# Patient Record
Sex: MALE | Race: WHITE | NOT HISPANIC OR LATINO | Employment: OTHER | URBAN - NONMETROPOLITAN AREA
[De-identification: names, ages, dates, MRNs, and addresses within clinical notes are randomized per-mention and may not be internally consistent; named-entity substitution may affect disease eponyms.]

---

## 2022-08-24 NOTE — PROGRESS NOTES
Jonathan Monreal presents to [facility]      Chief Complaint  Hypertension, Hyperlipidemia    HPI     Respiratory:  SOA:  no. Exertional dyspnea: no.  Dyspnea at rest: no.  Flights of stairs climbable: 5  Cardiology:  Chest pain: no.  Dizziness: no. Easily fatigued: no.  Pedal edema: no.  Light-headiness: no.  Symptoms of claudication: no.  Orthopnea: no.  Palpitations: no. Tachycardia: no.  Ophthalmology:  Last eye exam date: 2019 . Vision changes: none.  Gastroenterology:  Heartburn: no.  Nausea: no.      Current Outpatient Medications:   •  ASPIRIN 81 PO, Take 1 tablet by mouth Daily., Disp: , Rfl:   •  atorvastatin (LIPITOR) 20 MG tablet, Take 20 mg by mouth Every Night., Disp: , Rfl:   •  carvedilol (COREG) 12.5 MG tablet, Take 12.5 mg by mouth 2 (Two) Times a Day., Disp: , Rfl:   •  donepezil (ARICEPT) 10 MG tablet, One-half tab po qd., Disp: , Rfl:   •  fenofibrate (TRICOR) 54 MG tablet, Take 54 mg by mouth Daily With Dinner., Disp: , Rfl:   •  finasteride (PROSCAR) 5 MG tablet, Take 5 mg by mouth Daily., Disp: , Rfl:   •  levothyroxine (SYNTHROID, LEVOTHROID) 125 MCG tablet, Take 125 mcg by mouth Daily., Disp: , Rfl:   •  memantine (NAMENDA) 10 MG tablet, Take 1 tablet by mouth 2 (Two) Times a Day., Disp: , Rfl:   •  metFORMIN (GLUCOPHAGE) 1000 MG tablet, Take 1,000 mg by mouth 2 (Two) Times a Day With Meals., Disp: , Rfl:   •  multivitamin (THERAGRAN) tablet tablet, Take 1 tablet by mouth Daily., Disp: , Rfl:   •  quinapril (ACCUPRIL) 40 MG tablet, Take 20 mg by mouth 2 (Two) Times a Day., Disp: , Rfl:   •  Trulicity 0.75 MG/0.5ML solution pen-injector, Inject 0.5 mL under the skin into the appropriate area as directed Every 7 (Seven) Days., Disp: , Rfl:   •  Vitamin B12 (CYANOCOBALAMIN) 500 MCG tablet tablet, Take 1 tablet by mouth Daily., Disp: , Rfl:      Allergies   Allergen Reactions   • Penicillins Hives        Past Medical History:   Diagnosis Date   • Alzheimer's  "dementia ([facility]) 2022   • BPH (benign prostatic hyperplasia)    • Diabetes ([facility])    • Hyperlipidemia    • Hypertension    • Hypothyroid    • Wears glasses        Past Surgical History:   Procedure Laterality Date   • CIRCUMCISION  03/1943   • HAND SURGERY Right 1977    Injury        Family History   Problem Relation Age of Onset   • Coronary artery disease Mother    • Alzheimer's disease Father    • Lung cancer Father    • Myasthenia gravis Sister    • Other Son         TBI @ BIRTH; HANDICAPPED MENTALLY        Social History     Socioeconomic History   • Marital status:    Tobacco Use   • Smoking status: Never Smoker   • Smokeless tobacco: Never Used   Substance and Sexual Activity   • Alcohol use: Never   • Drug use: Never   • Sexual activity: Defer        ROS: As in HPI      Objective   Vital Signs:  /58 (BP Location: Left arm, Patient Position: Sitting, Cuff Size: Adult)   Pulse 69   Temp 97.7 °F (36.5 °C) (Infrared)   Resp 16   Ht 175.3 cm (69\")   Wt 89.5 kg (197 lb 6.4 oz)   SpO2 98%   BMI 29.15 kg/m²   Estimated body mass index is 29.15 kg/m² as calculated from the following:    Height as of this encounter: 175.3 cm (69\").    Weight as of this encounter: 89.5 kg (197 lb 6.4 oz).        Physical Exam  Constitutional:       Comments: No acute distress. Well kept. Pleasant, cheerful, relaxed, interactive, coherant   Neck:      Vascular: No JVD.   Cardiovascular:      Rate and Rhythm: Normal rate and regular rhythm.      Pulses:           Carotid pulses are 2+ on the right side and 2+ on the left side.       Radial pulses are 2+ on the right side and 2+ on the left side.        Femoral pulses are 2+ on the right side and 1+ on the left side.       Popliteal pulses are 2+ on the right side and 2+ on the left side.        Dorsalis pedis pulses are detected w/ Doppler on the right side and detected w/ Doppler on the left side.        Posterior tibial pulses are 2+ on the right side and 2+ on the " left side.      Heart sounds: Normal heart sounds.      Comments: No lower extremity varicosities  Pulmonary:      Effort: Pulmonary effort is normal.      Breath sounds: Normal breath sounds.   Musculoskeletal:      Right lower leg: No edema.      Left lower leg: No edema.   Skin:     Comments: No dystrophic toenails. No integument changes of the shins or feet.  Toes are warm and pink         Result Review :                    Assessment and Plan   Diagnoses and all orders for this visit:    1. Benign hypertension (Primary)  Assessment & Plan:  MDM:  Hypertension is stable.  Discussed mild increase in systolic BP this morning.  Discussed proper nutrition, low sodium, low saturated fats.  No change in medication, at this time.  Review of prior vital signs done and discussed.  Labs are done by his [place] physician and he is to follow-up there in 3 months      2. Mixed hyperlipidemia    3. Type 2 diabetes mellitus without complication, without long-term current use of insulin (HCC)  Assessment & Plan:  MDM:  Diabetes is stable. Avoid sweets, pastries, bread products, pasta, potatoes, sweetened beverages.   Consume fruit, vegetables, whole grains, low fat dairy, baked fish, poultry, lean meats        4. Acquired hypothyroidism    30 min spent with patient, discussion of cardiac function, vascular assessment of the feet, review of past charting, when to follow up with his [place] physician and also obtain ophthalmic appointment         Follow Up   Return in about 6 months (around 2/25/2023) for Cor.    Patient was given instructions and counseling regarding his condition or for health maintenance advice. Please see specific information pulled into the AVS if appropriate.   There are no Patient Instructions on file for this visit.

## 2022-08-25 ENCOUNTER — OFFICE VISIT (OUTPATIENT)
Dept: FAMILY MEDICINE CLINIC | Age: 77
End: 2022-08-25

## 2022-08-25 VITALS
OXYGEN SATURATION: 98 % | WEIGHT: 197.4 LBS | HEIGHT: 69 IN | DIASTOLIC BLOOD PRESSURE: 58 MMHG | TEMPERATURE: 97.7 F | HEART RATE: 69 BPM | SYSTOLIC BLOOD PRESSURE: 142 MMHG | BODY MASS INDEX: 29.24 KG/M2 | RESPIRATION RATE: 16 BRPM

## 2022-08-25 DIAGNOSIS — E03.9 ACQUIRED HYPOTHYROIDISM: ICD-10-CM

## 2022-08-25 DIAGNOSIS — E78.2 MIXED HYPERLIPIDEMIA: ICD-10-CM

## 2022-08-25 DIAGNOSIS — I10 BENIGN HYPERTENSION: Primary | ICD-10-CM

## 2022-08-25 DIAGNOSIS — E11.9 TYPE 2 DIABETES MELLITUS WITHOUT COMPLICATION, WITHOUT LONG-TERM CURRENT USE OF INSULIN: ICD-10-CM

## 2022-08-25 PROCEDURE — 99214 OFFICE O/P EST MOD 30 MIN: CPT | Performed by: FAMILY MEDICINE

## 2022-08-25 RX ORDER — DULAGLUTIDE 0.75 MG/.5ML
0.5 INJECTION, SOLUTION SUBCUTANEOUS
COMMUNITY
Start: 2022-08-04

## 2022-08-25 RX ORDER — FENOFIBRATE 54 MG/1
54 TABLET ORAL
COMMUNITY
Start: 2022-05-26

## 2022-08-25 RX ORDER — DONEPEZIL HYDROCHLORIDE 10 MG/1
TABLET, FILM COATED ORAL
COMMUNITY
Start: 2022-08-03

## 2022-08-25 RX ORDER — LANOLIN ALCOHOL/MO/W.PET/CERES
1 CREAM (GRAM) TOPICAL DAILY
COMMUNITY

## 2022-08-25 RX ORDER — ATORVASTATIN CALCIUM 20 MG/1
20 TABLET, FILM COATED ORAL NIGHTLY
COMMUNITY
Start: 2022-07-09

## 2022-08-25 RX ORDER — LEVOTHYROXINE SODIUM 0.12 MG/1
125 TABLET ORAL DAILY
COMMUNITY

## 2022-08-25 RX ORDER — QUINAPRIL 40 MG/1
20 TABLET ORAL 2 TIMES DAILY
COMMUNITY
Start: 2022-05-27

## 2022-08-25 RX ORDER — CARVEDILOL 12.5 MG/1
12.5 TABLET ORAL 2 TIMES DAILY
COMMUNITY
Start: 2022-08-03

## 2022-08-25 RX ORDER — DIPHENOXYLATE HYDROCHLORIDE AND ATROPINE SULFATE 2.5; .025 MG/1; MG/1
1 TABLET ORAL DAILY
COMMUNITY

## 2022-08-25 RX ORDER — FINASTERIDE 5 MG/1
5 TABLET, FILM COATED ORAL DAILY
COMMUNITY
Start: 2022-05-26

## 2022-08-25 RX ORDER — MEMANTINE HYDROCHLORIDE 10 MG/1
1 TABLET ORAL 2 TIMES DAILY
COMMUNITY
Start: 2022-08-18

## 2022-08-25 NOTE — ASSESSMENT & PLAN NOTE
MDM:  Hypertension is stable.  Discussed mild increase in systolic BP this morning.  Discussed proper nutrition, low sodium, low saturated fats.  No change in medication, at this time.  Review of prior vital signs done and discussed.  Labs are done by his [place] physician and he is to follow-up there in 3 months

## 2022-08-25 NOTE — ASSESSMENT & PLAN NOTE
MDM:  Diabetes is stable. Avoid sweets, pastries, bread products, pasta, potatoes, sweetened beverages.   Consume fruit, vegetables, whole grains, low fat dairy, baked fish, poultry, lean meats

## 2022-08-31 ENCOUNTER — PATIENT ROUNDING (BHMG ONLY) (OUTPATIENT)
Dept: FAMILY MEDICINE CLINIC | Age: 77
End: 2022-08-31

## 2022-08-31 NOTE — PROGRESS NOTES
August 31, 2022    Hello, may I speak with Jonathan Monreal?    My name is Betty     I am  with [facility]  [address] [place] [address] [place] [address].    Before we get started may I verify your date of birth? 1945    I am calling to officially welcome you to our practice and ask about your recent visit. Is this a good time to talk? yes    Tell me about your visit with us. What things went well? the whole visit went well. Dr. Tomlinson is a great doctor.       We're always looking for ways to make our patients' experiences even better. Do you have recommendations on ways we may improve?  no    Overall were you satisfied with your first visit to our practice? yes       I appreciate you taking the time to speak with me today. Is there anything else I can do for you? no      Thank you, and have a great day.

## 2023-04-26 ENCOUNTER — TELEPHONE (OUTPATIENT)
Dept: FAMILY MEDICINE CLINIC | Age: 78
End: 2023-04-26
Payer: MEDICARE

## 2023-04-26 DIAGNOSIS — I10 BENIGN HYPERTENSION: Primary | ICD-10-CM

## 2023-04-26 RX ORDER — DONEPEZIL HYDROCHLORIDE 10 MG/1
10 TABLET, FILM COATED ORAL NIGHTLY
Qty: 30 TABLET | Refills: 2 | Status: SHIPPED | OUTPATIENT
Start: 2023-04-26

## 2023-04-26 RX ORDER — QUINAPRIL 40 MG/1
20 TABLET ORAL 2 TIMES DAILY
Qty: 180 TABLET | Refills: 0 | Status: SHIPPED | OUTPATIENT
Start: 2023-04-26 | End: 2023-04-26 | Stop reason: ALTCHOICE

## 2023-04-26 RX ORDER — QUINAPRIL 20 MG/1
20 TABLET ORAL 2 TIMES DAILY
Qty: 360 TABLET | Refills: 0 | Status: SHIPPED | OUTPATIENT
Start: 2023-04-26

## 2023-04-26 RX ORDER — ATORVASTATIN CALCIUM 20 MG/1
20 TABLET, FILM COATED ORAL NIGHTLY
Qty: 90 TABLET | Refills: 0 | Status: SHIPPED | OUTPATIENT
Start: 2023-04-26

## 2023-04-26 RX ORDER — QUINAPRIL 40 MG/1
20 TABLET ORAL 2 TIMES DAILY
Qty: 180 TABLET | Refills: 0 | Status: CANCELLED | OUTPATIENT
Start: 2023-04-26

## 2023-04-26 RX ORDER — MEMANTINE HYDROCHLORIDE 10 MG/1
10 TABLET ORAL 2 TIMES DAILY
Qty: 180 TABLET | Refills: 0 | Status: SHIPPED | OUTPATIENT
Start: 2023-04-26

## 2023-04-26 NOTE — TELEPHONE ENCOUNTER
Pt came into the office and reported these medications need a refill sent to [facility] in [facility].      Rx Refill Note  Requested Prescriptions     Pending Prescriptions Disp Refills   • donepezil (ARICEPT) 10 MG tablet 30 tablet 2     Sig: Take 1 tablet by mouth Every Night. One-half tab po qd.   • memantine (NAMENDA) 10 MG tablet 180 tablet 0     Sig: Take 1 tablet by mouth 2 (Two) Times a Day.   • atorvastatin (LIPITOR) 20 MG tablet 90 tablet 0     Sig: Take 1 tablet by mouth Every Night.   • quinapril (ACCUPRIL) 40 MG tablet 180 tablet 0     Sig: Take 0.5 tablets by mouth 2 (Two) Times a Day.      Last office visit with prescribing clinician: 8/25/2022   Last telemedicine visit with prescribing clinician: 5/3/2023   Next office visit with prescribing clinician: 5/3/2023                         Would you like a call back once the refill request has been completed: [] Yes [] No    If the office needs to give you a call back, can they leave a voicemail: [] Yes [] No    Inez Churchill MA  04/26/23, 10:34 EDT

## 2023-04-26 NOTE — TELEPHONE ENCOUNTER
Called and spoke to patient. He was not home to check remaining pill quantity. Pt stated that you can try to send this Rx to [facility] in [place].

## 2023-04-26 NOTE — TELEPHONE ENCOUNTER
Called pharmacy and verified script instructions for donepezil 10 mg.    Pharmacy also stated that every dosage of Quinapril (ACCUPRIL) is on back order. Pt is not able to start this right now due to back order.

## 2023-04-26 NOTE — TELEPHONE ENCOUNTER
Hub staff attempted to follow warm transfer process and was unsuccessful     Caller: [facility] - [place], [place] - [address] - [phone]  - [phone] FX    Relationship to patient: Pharmacy    Best call back number: [phone]    PHARMACY NEEDS CLARIFICATION ON donepezil (ARICEPT) 10 MG tablet.

## 2023-05-09 ENCOUNTER — OFFICE VISIT (OUTPATIENT)
Dept: FAMILY MEDICINE CLINIC | Age: 78
End: 2023-05-09
Payer: MEDICARE

## 2023-05-09 VITALS
OXYGEN SATURATION: 98 % | SYSTOLIC BLOOD PRESSURE: 165 MMHG | TEMPERATURE: 97.7 F | HEART RATE: 71 BPM | HEIGHT: 69 IN | WEIGHT: 192.4 LBS | BODY MASS INDEX: 28.5 KG/M2 | RESPIRATION RATE: 16 BRPM | DIASTOLIC BLOOD PRESSURE: 74 MMHG

## 2023-05-09 DIAGNOSIS — I10 BENIGN HYPERTENSION: Primary | ICD-10-CM

## 2023-05-09 DIAGNOSIS — E03.9 ACQUIRED HYPOTHYROIDISM: ICD-10-CM

## 2023-05-09 DIAGNOSIS — E78.2 MIXED HYPERLIPIDEMIA: ICD-10-CM

## 2023-05-09 DIAGNOSIS — E11.9 TYPE 2 DIABETES MELLITUS WITHOUT COMPLICATION, WITHOUT LONG-TERM CURRENT USE OF INSULIN: ICD-10-CM

## 2023-05-09 RX ORDER — LEVOTHYROXINE SODIUM 112 UG/1
1 TABLET ORAL DAILY
COMMUNITY
Start: 2023-03-06

## 2023-05-09 RX ORDER — ENALAPRIL MALEATE 20 MG/1
1 TABLET ORAL DAILY
COMMUNITY
Start: 2023-04-06

## 2023-05-09 NOTE — ASSESSMENT & PLAN NOTE
MDM: Patient encouraged to obtain ophthalmic appointment.  Avoid sweets, pastries, bread products, pasta, potatoes, sweetened beverages.   Consume fruit, vegetables, whole grains, low fat dairy, baked fish, poultry, lean meats

## 2023-05-09 NOTE — ASSESSMENT & PLAN NOTE
MDM: Review of chart shows persistent BP elevation.  Increase ACEI.  Gerardo in 2 week, sooner if not feeling well

## 2023-05-09 NOTE — PROGRESS NOTES
Jonathan Monreal presents to [facility]      Chief Complaint  HTN    HPI Does not know what his BP readings have been by his [place] PCP.    Respiratory:  SOA:  no. Exertional dyspnea: no.  Dyspnea at rest: no.  Flights of stairs climbable: 5  Cardiology:  Chest pain: no.  Dizziness: no. Easily fatigued: no.  Pedal edema: no.  Light-headiness: no.  Symptoms of claudication: no.  Orthopnea: no.  Palpitations: no. Tachycardia: no.  Ophthalmology:  Last eye exam date: 2019 . Vision changes: none.  Gastroenterology:  Heartburn: no.  Nausea: no.    ROS: Synthroid dose decreased this past January.  Gerardo on level was not done.  Taking Synthroid at night with all of his other medications      Current Outpatient Medications:   •  ASPIRIN 81 PO, Take 1 tablet by mouth Daily., Disp: , Rfl:   •  atorvastatin (LIPITOR) 20 MG tablet, Take 1 tablet by mouth Every Night., Disp: 90 tablet, Rfl: 0  •  carvedilol (COREG) 12.5 MG tablet, Take 1 tablet by mouth 2 (Two) Times a Day., Disp: , Rfl:   •  fenofibrate (TRICOR) 54 MG tablet, Take 1 tablet by mouth Daily With Dinner., Disp: , Rfl:   •  finasteride (PROSCAR) 5 MG tablet, Take 1 tablet by mouth Daily., Disp: , Rfl:   •  metFORMIN (GLUCOPHAGE) 1000 MG tablet, Take 1 tablet by mouth 2 (Two) Times a Day With Meals., Disp: , Rfl:   •  multivitamin (THERAGRAN) tablet tablet, Take 1 tablet by mouth Daily., Disp: , Rfl:   •  Trulicity 0.75 MG/0.5ML solution pen-injector, Inject 0.75 mg under the skin into the appropriate area as directed Every 7 (Seven) Days., Disp: , Rfl:   •  donepezil (ARICEPT) 10 MG tablet, Take 1 tablet by mouth Every Night. One-half tab po qd., Disp: 30 tablet, Rfl: 2  •  enalapril (VASOTEC) 20 MG tablet, Take 1 tablet by mouth Daily., Disp: , Rfl:   •  levothyroxine (SYNTHROID, LEVOTHROID) 112 MCG tablet, Take 1 tablet by mouth Daily., Disp: , Rfl:   •  memantine (NAMENDA) 10 MG tablet, Take 1 tablet by mouth 2 (Two) Times a Day.,  "Disp: 180 tablet, Rfl: 0     Allergies   Allergen Reactions   • Penicillins Hives        Past Medical History:   Diagnosis Date   • Alzheimer's dementia 2022   • BPH (benign prostatic hyperplasia)    • Diabetes    • Wears glasses        Past Surgical History:   Procedure Laterality Date   • CIRCUMCISION  03/1943   • HAND SURGERY Right 1977    Injury        Family History   Problem Relation Age of Onset   • Coronary artery disease Mother    • Alzheimer's disease Father    • Lung cancer Father    • Myasthenia gravis Sister    • Other Son         TBI @ BIRTH; HANDICAPPED MENTALLY        Social History     Socioeconomic History   • Marital status:    Tobacco Use   • Smoking status: Never     Passive exposure: Past   • Smokeless tobacco: Never   Vaping Use   • Vaping Use: Never used   Substance and Sexual Activity   • Alcohol use: Never   • Drug use: Never   • Sexual activity: Defer            Objective   Vital Signs:  /74 (BP Location: Right arm, Patient Position: Sitting, Cuff Size: Adult)   Pulse 71   Temp 97.7 °F (36.5 °C) (Temporal)   Resp 16   Ht 175.3 cm (69\")   Wt 87.3 kg (192 lb 6.4 oz)   SpO2 98%   BMI 28.41 kg/m²   Estimated body mass index is 28.41 kg/m² as calculated from the following:    Height as of this encounter: 175.3 cm (69\").    Weight as of this encounter: 87.3 kg (192 lb 6.4 oz).        Physical Exam  General:  No acute distress, cheerful, well kept, interactive, good energy level.  Cardiac:  Heart regular rate and rhythm without murmur.  Respiratory:  Lungs clear to auscultation bilaterally and excellent air movement throughout.  Pulses:                            Right          Left         Bruit  Carotid                2+              2+           No  Radial                 2+              2+  Abd Ao               Not bounding  Femoral             Not palpable bilaterally  DP                      1+              1+  PT                      2+              2+  Venous:  Absent " JVD. Absent lower extremity varicosities.  Dermatology:  Non-dystrophic toe nails. No Integument changes of the toes, feet, ankles, shins.  Lower Extremities:  No leg edema.  Toes are warm and pink    Result Review :                    Assessment and Plan   Diagnoses and all orders for this visit:    1. Benign hypertension (Primary)  Assessment & Plan:  MDM: Review of chart shows persistent BP elevation.  Increase ACEI.  Gerardo in 2 week, sooner if not feeling well      2. Acquired hypothyroidism  Assessment & Plan:  MDM; Discussed changing Synthroid to AM on an empty stomach.    Orders:  -     TSH    3. Mixed hyperlipidemia  Assessment & Plan:  MDM: Labs done in [place], this past January.  Will obtain and review the results      4. Type 2 diabetes mellitus without complication, without long-term current use of insulin  Assessment & Plan:  MDM: Patient encouraged to obtain ophthalmic appointment.  Avoid sweets, pastries, bread products, pasta, potatoes, sweetened beverages.   Consume fruit, vegetables, whole grains, low fat dairy, baked fish, poultry, lean meats                 Follow Up   Return in about 2 weeks (around 5/23/2023) for F/U BP.    Patient was given instructions and counseling regarding his condition or for health maintenance advice. Please see specific information pulled into the AVS if appropriate.     Odessa Tomlinson MD

## 2023-05-10 ENCOUNTER — CLINICAL SUPPORT (OUTPATIENT)
Dept: FAMILY MEDICINE CLINIC | Age: 78
End: 2023-05-10
Payer: MEDICARE

## 2023-05-10 DIAGNOSIS — E03.9 ACQUIRED HYPOTHYROIDISM: ICD-10-CM

## 2023-05-10 PROCEDURE — 36415 COLL VENOUS BLD VENIPUNCTURE: CPT | Performed by: FAMILY MEDICINE

## 2023-05-10 PROCEDURE — 84443 ASSAY THYROID STIM HORMONE: CPT | Performed by: FAMILY MEDICINE

## 2023-05-10 NOTE — PROGRESS NOTES
Venipuncture Blood Specimen Collection  Venipuncture performed in (L) arm via butterfly by Aneta Mejia LPN with good hemostasis. Patient tolerated the procedure well without complications.   05/10/23   Aneta Mejia LPN

## 2023-05-11 LAB — TSH SERPL DL<=0.05 MIU/L-ACNC: 0.33 UIU/ML (ref 0.27–4.2)

## 2023-05-24 ENCOUNTER — OFFICE VISIT (OUTPATIENT)
Dept: FAMILY MEDICINE CLINIC | Age: 78
End: 2023-05-24
Payer: MEDICARE

## 2023-05-24 VITALS
WEIGHT: 193 LBS | OXYGEN SATURATION: 97 % | TEMPERATURE: 97.1 F | SYSTOLIC BLOOD PRESSURE: 142 MMHG | RESPIRATION RATE: 16 BRPM | DIASTOLIC BLOOD PRESSURE: 58 MMHG | BODY MASS INDEX: 28.58 KG/M2 | HEIGHT: 69 IN | HEART RATE: 68 BPM

## 2023-05-24 DIAGNOSIS — I10 BENIGN HYPERTENSION: Primary | ICD-10-CM

## 2023-05-24 DIAGNOSIS — Z79.899 ENCOUNTER FOR MEDICATION REVIEW: ICD-10-CM

## 2023-05-24 DIAGNOSIS — R19.5 LOOSE STOOLS: ICD-10-CM

## 2023-05-24 PROCEDURE — 3078F DIAST BP <80 MM HG: CPT | Performed by: FAMILY MEDICINE

## 2023-05-24 PROCEDURE — 1159F MED LIST DOCD IN RCRD: CPT | Performed by: FAMILY MEDICINE

## 2023-05-24 PROCEDURE — 3077F SYST BP >= 140 MM HG: CPT | Performed by: FAMILY MEDICINE

## 2023-05-24 PROCEDURE — 99213 OFFICE O/P EST LOW 20 MIN: CPT | Performed by: FAMILY MEDICINE

## 2023-05-24 PROCEDURE — 1160F RVW MEDS BY RX/DR IN RCRD: CPT | Performed by: FAMILY MEDICINE

## 2023-05-24 NOTE — PROGRESS NOTES
"Chief Complaint  Hypertension (Follow up )    History of Present Illness   No side effects from increased dose of Enalapril 20 mg tab to 2 tabs q AM.  Working outside in the sun.  No palpitations, light-headiness, chest discomfort, shortness of air, headache, blurred vision or pedal edema.     Review of Systems : Loose stools x 2-3/day since return from his [facility] home. No blood, melena, mucous, abdominal pain or cramping.  Appetite is good.  Not sure if his sugar intake or fruit intake has increased or other changes in his diet    Objective     Vital Signs:   /58 (BP Location: Left arm, Patient Position: Sitting)   Pulse 68   Temp 97.1 °F (36.2 °C) (Infrared)   Resp 16   Ht 175.3 cm (69\")   Wt 87.5 kg (193 lb)   SpO2 97%   BMI 28.50 kg/m²   Current Outpatient Medications on File Prior to Visit   Medication Sig Dispense Refill   • ASPIRIN 81 PO Take 1 tablet by mouth Daily.     • atorvastatin (LIPITOR) 20 MG tablet Take 1 tablet by mouth Every Night. 90 tablet 0   • carvedilol (COREG) 12.5 MG tablet Take 1 tablet by mouth 2 (Two) Times a Day.     • donepezil (ARICEPT) 10 MG tablet Take 1 tablet by mouth Every Night. One-half tab po qd. 30 tablet 2   • enalapril (VASOTEC) 20 MG tablet Take 2 tablets by mouth Daily.     • fenofibrate (TRICOR) 54 MG tablet Take 1 tablet by mouth Daily With Dinner.     • finasteride (PROSCAR) 5 MG tablet Take 1 tablet by mouth Daily.     • levothyroxine (SYNTHROID, LEVOTHROID) 112 MCG tablet Take 1 tablet by mouth Daily.     • memantine (NAMENDA) 10 MG tablet Take 1 tablet by mouth 2 (Two) Times a Day. 180 tablet 0   • metFORMIN (GLUCOPHAGE) 1000 MG tablet Take 1 tablet by mouth 2 (Two) Times a Day With Meals.     • multivitamin (THERAGRAN) tablet tablet Take 1 tablet by mouth Daily.     • Trulicity 0.75 MG/0.5ML solution pen-injector Inject 0.75 mg under the skin into the appropriate area as directed Every 7 (Seven) Days.       No current facility-administered " medications on file prior to visit.        Past Medical History:   Diagnosis Date   • Alzheimer's dementia 2022   • BPH (benign prostatic hyperplasia)    • Diabetes    • Wears glasses       Past Surgical History:   Procedure Laterality Date   • CIRCUMCISION  03/1943   • HAND SURGERY Right 1977    Injury      Family History   Problem Relation Age of Onset   • Coronary artery disease Mother    • Alzheimer's disease Father    • Lung cancer Father    • Myasthenia gravis Sister    • Other Son         TBI @ BIRTH; HANDICAPPED MENTALLY      Social History     Socioeconomic History   • Marital status:    Tobacco Use   • Smoking status: Never     Passive exposure: Past   • Smokeless tobacco: Never   Vaping Use   • Vaping Use: Never used   Substance and Sexual Activity   • Alcohol use: Never   • Drug use: Never   • Sexual activity: Defer         Office Visit on 05/09/2023   Component Date Value Ref Range Status   • TSH 05/10/2023 0.328  0.270 - 4.200 uIU/mL Final         Physical Exam   GENERAL:   No acute distress, pleasant, excellent energy level.  HEART:    Regular rate and rhythm without murmur.  LUNGS:    Clear to auscultation, excellent air movement throughout.   EXTREMITIES:  No pedal edema.  INTEGUMENT:  Warm, dry, pink.  No exanthema.  Abdomen: No abdominal distension.  Bowel sounds normal pitch and frequency x 4 quadrants. No palpable mass.  No abdominal tenderness.      Result Review  Data Reviewed:{ Labs  Result Review  Imaging  Med Tab  Media :23}                     Assessment and Plan {CC Problem List  Visit Diagnosis  ROS  Review (Popup)  Health Maintenance  Quality  BestPractice  Medications  SmartSets  SnapShot Encounters  Media :23}   Diagnoses and all orders for this visit:    1. Benign hypertension (Primary)  Assessment & Plan:  MDM: Improved BP without side effects from increased dose of Enalapril.  Continue 40 mg daily      2. Encounter for medication review  Comments:  MDM: Some  confusion.  Had patient retrieve his Rx and rtn to office this morning.  Rx bottles checked and discussed    3. Loose stools  Comments:  MDM: May take OTC  fiber supplements to take up fluid in the small bowel. Rtn if symptoms persist        Follow Up {Instructions Charge Capture  Follow-up Communications :23}     Patient was given instructions and counseling regarding his condition or for health maintenance advice. Please see specific information pulled into the AVS (placed there by myself) if appropriate.    No follow-ups on file.        Odessa Tomlinson MD

## 2023-06-26 ENCOUNTER — TELEPHONE (OUTPATIENT)
Dept: FAMILY MEDICINE CLINIC | Age: 78
End: 2023-06-26

## 2023-06-26 NOTE — TELEPHONE ENCOUNTER
Caller: NERY SCOTT    Relationship to patient: Child    Best call back number: [phone]    Patient is needing: PATIENT'S SON CALLED AND SAID HE NEEDED TO SCHEDULE A HOSPITAL FOLLOW UP FOR HIS FATHER     HE WAS TAKEN TO THE [facility] EMERGENCY ROOM ON FRIDAY, 06/23 AFTER PASSING OUT     HE WAS ADMITTED AND RELEASED THE FOLLOWING DAY, 06/24     HUB ATTEMPTED TO WARM TRANSFER, OFFICE ADVISED THEY COULD NOT SCHEDULE BECAUSE THE PATIENT'S SON IS NOT ON THE VERBAL AGREEMENT     HUB DID NOT SEE ANYTHING WITHIN SEVEN DAYS FROM THE PATIENT'S DISCHARGE DATE AVAILABLE     REQUESTED CALLBACK TO SCHEDULE

## 2023-06-26 NOTE — TELEPHONE ENCOUNTER
Caller: NERY SCOTT    Relationship to patient: Child    Best call back number:[phone]    Patient is needing:   PATIENT SON IS WAITING FOR A CALL BACK

## 2023-07-20 NOTE — TELEPHONE ENCOUNTER
Tawny is instructed to read the documentation below to patient    Called and s/w patient's son, Terry in regards to clarification on patient's Donepezil as to where there are two different directions on the prescription. Terry is unsure of what patient is actually taking. Terry requested a copy of patient's medication list. Med list printed out, and patient's son Ferdinand came and picked it up.     Terry was also asked per request of Dr. Tomlinson if patient is currently seeing a Neurologist. Terry stated that patient has not seen a Neurologist in approximately 1 year (about this time last year), but they have been discussing taking him back to a Neurologist.     Terry will return phone call to office to let us know.

## 2023-07-25 RX ORDER — DONEPEZIL HYDROCHLORIDE 10 MG/1
TABLET, FILM COATED ORAL
Qty: 90 TABLET | Refills: 0 | Status: SHIPPED | OUTPATIENT
Start: 2023-07-25

## 2023-08-10 RX ORDER — MEMANTINE HYDROCHLORIDE 10 MG/1
TABLET ORAL
Qty: 180 TABLET | Refills: 0 | Status: SHIPPED | OUTPATIENT
Start: 2023-08-10

## 2023-08-10 NOTE — TELEPHONE ENCOUNTER
Rx Refill Note  Requested Prescriptions     Pending Prescriptions Disp Refills    memantine (NAMENDA) 10 MG tablet [Pharmacy Med Name: Memantine HCl 10 MG Oral Tablet] 180 tablet 0     Sig: Take 1 tablet by mouth twice daily      Last office visit with prescribing clinician: 6/28/2023   Last telemedicine visit with prescribing clinician: Visit date not found   Next office visit with prescribing clinician: 8/24/2023                         Would you like a call back once the refill request has been completed: [] Yes [] No    If the office needs to give you a call back, can they leave a voicemail: [] Yes [] No    Mariah Rudolph CMA  08/10/23, 11:19 EDT

## 2023-09-05 ENCOUNTER — TELEPHONE (OUTPATIENT)
Dept: FAMILY MEDICINE CLINIC | Facility: CLINIC | Age: 78
End: 2023-09-05

## 2023-09-05 NOTE — TELEPHONE ENCOUNTER
Caller: TRENTON    Relationship:   PATIENTS ALEX Miller call back number:     What form or medical record are you requesting: LIST OF CURRENT MEDICATIONS    Who is requesting this form or medical record from you:     How would you like to receive the form or medical records (pick-up, mail, fax):     If fax, what is the fax number:  LATOYA TRENTON  If mail, what is the address:   If pick-up, provide patient with address and location details    Timeframe paperwork needed:     Additional notes: PATIENTS ALEX CHOWDHURY IS CALLING IN TO REQUEST A LIST OF ALL THE MEDICATIONS THAT THE PATIENT IS CURRENTLY TAKING  HE WANTS THIS FAXED ONCE IT IS COMPLETED.

## 2023-09-13 ENCOUNTER — TELEPHONE (OUTPATIENT)
Dept: FAMILY MEDICINE CLINIC | Facility: CLINIC | Age: 78
End: 2023-09-13
Payer: MEDICARE

## 2023-09-13 NOTE — TELEPHONE ENCOUNTER
Phoned pt's son Ferdinand who is on the pt's verbal. There was a question on how to take his Vasotec. Ferdinand was informed that his father is to take the medication twice daily. Ferdinand voiced understanding of information given.

## 2023-09-13 NOTE — TELEPHONE ENCOUNTER
Caller:     Relationship:     Best call back number: [phone]    What is the best time to reach you: ANYTIME     Who are you requesting to speak with (clinical staff, provider,  specific staff member): CLINICAL STAFF     Do you know the name of the person who called: ETTA     What was the call regarding: PATIENTS TISH CALLED AND STATED ENALAPRIL MEDICATION NEED TO KNOW IF HE IS SUPPOSE TO TAKE 1 TABLET OR 2 TABLETS A DAY. PLEASE ADVISE. THANKS     Is it okay if the provider responds through MyChart: N/A

## 2023-09-22 NOTE — TELEPHONE ENCOUNTER
Caller: JocelinMarisabel    Relationship: Emergency Contact    Best call back number: [phone]    Requested Prescriptions:   Requested Prescriptions     Pending Prescriptions Disp Refills    enalapril (VASOTEC) 20 MG tablet       Sig: Take 2 tablets by mouth Daily.        Pharmacy where request should be sent: [place] [address] [facility] - [phone]  - [phone]     Last office visit with prescribing clinician: 6/28/2023   Last telemedicine visit with prescribing clinician: Visit date not found   Next office visit with prescribing clinician: Visit date not found     Additional details provided by patient:     Does the patient have less than a 3 day supply:  [] Yes  [] No    Would you like a call back once the refill request has been completed: [] Yes [] No    If the office needs to give you a call back, can they leave a voicemail: [] Yes [] No    Syd Kumar   09/22/23 11:24 EDT

## 2023-09-28 RX ORDER — ENALAPRIL MALEATE 20 MG/1
40 TABLET ORAL DAILY
Qty: 30 TABLET | Refills: 0 | Status: SHIPPED | OUTPATIENT
Start: 2023-09-28

## 2023-09-28 NOTE — TELEPHONE ENCOUNTER
Rx Refill Note  Requested Prescriptions     Pending Prescriptions Disp Refills    enalapril (VASOTEC) 20 MG tablet       Sig: Take 2 tablets by mouth Daily.      Last office visit with prescribing clinician: 6/28/2023   Last telemedicine visit with prescribing clinician: Visit date not found   Next office visit with prescribing clinician: Visit date not found                         Would you like a call back once the refill request has been completed: [] Yes [] No    If the office needs to give you a call back, can they leave a voicemail: [] Yes [] No    Mariah Rudolph CMA  09/28/23, 14:30 EDT

## 2023-10-18 RX ORDER — CARVEDILOL 12.5 MG/1
12.5 TABLET ORAL 2 TIMES DAILY
Qty: 60 TABLET | Refills: 0 | Status: SHIPPED | OUTPATIENT
Start: 2023-10-18

## 2023-10-18 RX ORDER — ENALAPRIL MALEATE 20 MG/1
40 TABLET ORAL DAILY
Qty: 60 TABLET | Refills: 0 | Status: SHIPPED | OUTPATIENT
Start: 2023-10-18

## 2023-10-18 NOTE — TELEPHONE ENCOUNTER
MEDICATION WAS SWITCHED TO 60 TABLETS TO MAKE A 30 DAY SUPPLY. WAS INCREASED TO 40mg DAILY ON 05.24.2023    Rx Refill Note  Requested Prescriptions     Pending Prescriptions Disp Refills    enalapril (VASOTEC) 20 MG tablet 30 tablet 0     Sig: Take 2 tablets by mouth Daily.      Last office visit with prescribing clinician: 6/28/2023   Last telemedicine visit with prescribing clinician: Visit date not found   Next office visit with prescribing clinician: Visit date not found                         Would you like a call back once the refill request has been completed: [] Yes [] No    If the office needs to give you a call back, can they leave a voicemail: [] Yes [] No    Mariah Rudolph CMA  10/18/23, 15:13 EDT   no

## 2023-10-19 NOTE — TELEPHONE ENCOUNTER
SPOKE WITH PT GAVE HIM A NOV DATE HE STATED THAT HE MIGHT BE IN [place] I TOLD HIM TO CALL US EITHER WAY

## 2023-10-31 RX ORDER — DONEPEZIL HYDROCHLORIDE 10 MG/1
TABLET, FILM COATED ORAL
Qty: 30 TABLET | Refills: 0 | Status: SHIPPED | OUTPATIENT
Start: 2023-10-31

## 2023-10-31 NOTE — TELEPHONE ENCOUNTER
Can you call and schedule appointment with patient please? He was suppose to follow up around 08.24.2023 for Thyroid and fasting labs.    Thanks!

## 2023-11-14 RX ORDER — MEMANTINE HYDROCHLORIDE 10 MG/1
TABLET ORAL
Qty: 180 TABLET | Refills: 0 | Status: SHIPPED | OUTPATIENT
Start: 2023-11-14

## 2023-11-14 NOTE — TELEPHONE ENCOUNTER
Rx Refill Note  Requested Prescriptions     Pending Prescriptions Disp Refills    memantine (NAMENDA) 10 MG tablet [Pharmacy Med Name: Memantine HCl 10 MG Oral Tablet] 180 tablet 0     Sig: Take 1 tablet by mouth twice daily      Last office visit with prescribing clinician: 6/28/2023   Last telemedicine visit with prescribing clinician: Visit date not found   Next office visit with prescribing clinician: Visit date not found                         Would you like a call back once the refill request has been completed: [] Yes [] No    If the office needs to give you a call back, can they leave a voicemail: [] Yes [] No    Mariah Rudolph CMA  11/14/23, 10:01 EST

## 2023-11-20 RX ORDER — CARVEDILOL 12.5 MG/1
12.5 TABLET ORAL 2 TIMES DAILY
Qty: 60 TABLET | Refills: 0 | Status: SHIPPED | OUTPATIENT
Start: 2023-11-20

## 2023-11-20 NOTE — TELEPHONE ENCOUNTER
Can you call and schedule patient for an appointment please? He was suppose to be return in about 3 months (around 8/24/2023) for Thyroid, Fasting labs.     Thanks!

## 2023-12-01 RX ORDER — DONEPEZIL HYDROCHLORIDE 10 MG/1
TABLET, FILM COATED ORAL
Qty: 30 TABLET | Refills: 0 | Status: SHIPPED | OUTPATIENT
Start: 2023-12-01

## 2023-12-07 ENCOUNTER — OFFICE VISIT (OUTPATIENT)
Dept: FAMILY MEDICINE CLINIC | Facility: CLINIC | Age: 78
End: 2023-12-07
Payer: MEDICARE

## 2023-12-07 VITALS
SYSTOLIC BLOOD PRESSURE: 144 MMHG | HEART RATE: 66 BPM | WEIGHT: 194 LBS | HEIGHT: 66 IN | OXYGEN SATURATION: 98 % | DIASTOLIC BLOOD PRESSURE: 59 MMHG | TEMPERATURE: 96.9 F | BODY MASS INDEX: 31.18 KG/M2 | RESPIRATION RATE: 16 BRPM

## 2023-12-07 DIAGNOSIS — E78.2 MIXED HYPERLIPIDEMIA: ICD-10-CM

## 2023-12-07 DIAGNOSIS — R35.1 NOCTURIA: ICD-10-CM

## 2023-12-07 DIAGNOSIS — I10 BENIGN HYPERTENSION: Primary | ICD-10-CM

## 2023-12-07 DIAGNOSIS — E11.9 TYPE 2 DIABETES MELLITUS WITHOUT COMPLICATION, WITHOUT LONG-TERM CURRENT USE OF INSULIN: ICD-10-CM

## 2023-12-07 DIAGNOSIS — Z12.5 SCREENING FOR MALIGNANT NEOPLASM OF PROSTATE: ICD-10-CM

## 2023-12-07 DIAGNOSIS — E83.42 HYPOMAGNESEMIA: ICD-10-CM

## 2023-12-07 DIAGNOSIS — Z23 ENCOUNTER FOR IMMUNIZATION: ICD-10-CM

## 2023-12-07 DIAGNOSIS — E03.9 ACQUIRED HYPOTHYROIDISM: ICD-10-CM

## 2023-12-07 LAB
BILIRUB BLD-MCNC: NEGATIVE MG/DL
CLARITY, POC: CLEAR
COLOR UR: YELLOW
EXPIRATION DATE: ABNORMAL
GLUCOSE UR STRIP-MCNC: NEGATIVE MG/DL
KETONES UR QL: NEGATIVE
LEUKOCYTE EST, POC: NEGATIVE
Lab: ABNORMAL
NITRITE UR-MCNC: NEGATIVE MG/ML
PH UR: 6.5 [PH] (ref 5–8)
PROT UR STRIP-MCNC: ABNORMAL MG/DL
RBC # UR STRIP: NEGATIVE /UL
SP GR UR: 1.02 (ref 1–1.03)
UROBILINOGEN UR QL: ABNORMAL

## 2023-12-07 PROCEDURE — G0103 PSA SCREENING: HCPCS | Performed by: FAMILY MEDICINE

## 2023-12-07 PROCEDURE — 83036 HEMOGLOBIN GLYCOSYLATED A1C: CPT | Performed by: FAMILY MEDICINE

## 2023-12-07 PROCEDURE — 80053 COMPREHEN METABOLIC PANEL: CPT | Performed by: FAMILY MEDICINE

## 2023-12-07 PROCEDURE — 84443 ASSAY THYROID STIM HORMONE: CPT | Performed by: FAMILY MEDICINE

## 2023-12-07 PROCEDURE — 83735 ASSAY OF MAGNESIUM: CPT | Performed by: FAMILY MEDICINE

## 2023-12-07 NOTE — ASSESSMENT & PLAN NOTE
MDM: Patient does not check his blood glucose.  Gerardo A1c for level and possible etiology of polyuria

## 2023-12-07 NOTE — PROGRESS NOTES
Jonathan Monreal presents to [facility]      Chief Complaint  HTN    HPI     Respiratory:  SOA:  no. Exertional dyspnea: no.  Dyspnea at rest: no.  Flights of stairs climbable: 5.  Cardiology:  Chest pain: no.  Dizziness: no. Easily fatigued: no.  Pedal edema: no.  Light-headiness: no.  Symptoms of claudication: no.  Orthopnea: no.  Palpitations: no. Tachycardia: no.  Ophthalmology:  Last eye exam date: 2019 . Vision changes: none.  Gastroenterology:  Heartburn: no.  Nausea: no.    ROS: Increased urinary frequency, mainly during the night    Current Outpatient Medications:     ASPIRIN 81 PO, Take 1 tablet by mouth Daily., Disp: , Rfl:     atorvastatin (LIPITOR) 20 MG tablet, Take 1 tablet by mouth Every Night., Disp: 90 tablet, Rfl: 0    carvedilol (COREG) 12.5 MG tablet, Take 1 tablet by mouth twice daily, Disp: 60 tablet, Rfl: 0    levothyroxine (SYNTHROID, LEVOTHROID) 112 MCG tablet, Take 1 tablet by mouth Daily., Disp: , Rfl:     magnesium, as, gluconate (MAGONATE) 500 (27 Mg) MG tablet, Take 1 tablet by mouth Daily., Disp: 30 tablet, Rfl: 0    metFORMIN (GLUCOPHAGE) 1000 MG tablet, Take 1 tablet by mouth 2 (Two) Times a Day With Meals., Disp: , Rfl:     multivitamin (THERAGRAN) tablet tablet, Take 1 tablet by mouth Daily., Disp: , Rfl:     Trulicity 0.75 MG/0.5ML solution pen-injector, Inject 0.75 mg under the skin into the appropriate area as directed Every 7 (Seven) Days., Disp: , Rfl:     donepezil (ARICEPT) 10 MG tablet, TAKE 1 TABLET BY MOUTH AT NIGHT, Disp: 30 tablet, Rfl: 0    enalapril (VASOTEC) 20 MG tablet, Take 2 tablets by mouth Daily., Disp: 60 tablet, Rfl: 0    fenofibrate (TRICOR) 54 MG tablet, Take 1 tablet by mouth Daily With Dinner., Disp: , Rfl:     finasteride (PROSCAR) 5 MG tablet, Take 1 tablet by mouth Daily., Disp: , Rfl:     memantine (NAMENDA) 10 MG tablet, Take 1 tablet by mouth twice daily, Disp: 180 tablet, Rfl: 0     Allergies   Allergen Reactions  "   Penicillins Hives        Past Medical History:   Diagnosis Date    Alzheimer's dementia 2022    BPH (benign prostatic hyperplasia)     Diabetes     Melanoma 2013    Wears glasses        Past Surgical History:   Procedure Laterality Date    CIRCUMCISION  03/1943    HAND SURGERY Right 1977    Injury        Family History   Problem Relation Age of Onset    Coronary artery disease Mother     Alzheimer's disease Father     Lung cancer Father     Myasthenia gravis Sister     Other Son         TBI @ BIRTH; HANDICAPPED MENTALLY        Social History     Socioeconomic History    Marital status:    Tobacco Use    Smoking status: Never     Passive exposure: Past    Smokeless tobacco: Never   Vaping Use    Vaping Use: Never used   Substance and Sexual Activity    Alcohol use: Never    Drug use: Never    Sexual activity: Defer            Objective   Vital Signs:  /59 (BP Location: Left arm, Patient Position: Sitting, Cuff Size: Adult)   Pulse 66   Temp 96.9 °F (36.1 °C) (Temporal)   Resp 16   Ht 167.6 cm (66\")   Wt 88 kg (194 lb)   SpO2 98%   BMI 31.31 kg/m²   Estimated body mass index is 31.31 kg/m² as calculated from the following:    Height as of this encounter: 167.6 cm (66\").    Weight as of this encounter: 88 kg (194 lb).        Physical Exam  General:  No acute distress, cheerful, well kept, interactive, good energy level.  Cardiac:  Heart regular rate and rhythm without murmur.  Respiratory:  Lungs clear to auscultation bilaterally and excellent air movement throughout.  Pulses:                            Right          Left         Bruit  Carotid                2+              2+           No  Radial                 2+              2+  Abd Ao               Not bounding  Femoral             Not palpable bilaterally  DP                    Doptone       Absent  PT                      2+              2+  Venous:  Absent JVD. Absent lower extremity varicosities.  Dermatology:  Non-dystrophic toe " nails. No Integument changes of the toes, feet, ankles, shins.  Lower Extremities:  No leg edema.  Toes are warm and pink    Result Review :                    Assessment and Plan   Diagnoses and all orders for this visit:    1. Benign hypertension (Primary)  Assessment & Plan:  MDM: Stable. Continue present medications without changes.      Orders:  -     Comprehensive metabolic panel    2. Hypomagnesemia  -     Magnesium    3. Mixed hyperlipidemia  Assessment & Plan:  MDM: Stable. Continue present medications without changes.        4. Type 2 diabetes mellitus without complication, without long-term current use of insulin  Assessment & Plan:  MDM: Patient does not check his blood glucose.  Gerardo A1c for level and possible etiology of polyuria    Orders:  -     Hemoglobin A1c    5. Screening for malignant neoplasm of prostate  -     PSA SCREENING    6. Acquired hypothyroidism  Assessment & Plan:  MDM: Stable. Continue present medications without changes.      Orders:  -     TSH    7. Nocturia  Comments:  MDM: Increased urinary frequency.  Refer to urologist to assess for OAB  Orders:  -     POCT urinalysis dipstick, automated  -     Ambulatory Referral to Urology    8. Encounter for immunization  Comments:  MDM: Health maintenance.  Family members and [facility] immunizations are due             Follow Up   Return in about 3 months (around 3/7/2024) for Diabetes.    Patient was given instructions and counseling regarding his condition or for health maintenance advice. Please see specific information pulled into the AVS if appropriate.     Odessa Tomlinson MD

## 2023-12-07 NOTE — PROGRESS NOTES
Venipuncture Blood Specimen Collection  Venipuncture performed in Rt arm via venipuncture by Syd Kruger with good hemostasis. Patient tolerated the procedure well without complications.   12/07/23   Syd Kruger

## 2023-12-08 LAB
ALBUMIN SERPL-MCNC: 4.2 G/DL (ref 3.5–5.2)
ALBUMIN/GLOB SERPL: 1.7 G/DL
ALP SERPL-CCNC: 96 U/L (ref 39–117)
ALT SERPL W P-5'-P-CCNC: 23 U/L (ref 1–41)
ANION GAP SERPL CALCULATED.3IONS-SCNC: 9.6 MMOL/L (ref 5–15)
AST SERPL-CCNC: 19 U/L (ref 1–40)
BILIRUB SERPL-MCNC: 0.4 MG/DL (ref 0–1.2)
BUN SERPL-MCNC: 12 MG/DL (ref 8–23)
BUN/CREAT SERPL: 12.1 (ref 7–25)
CALCIUM SPEC-SCNC: 9.8 MG/DL (ref 8.6–10.5)
CHLORIDE SERPL-SCNC: 101 MMOL/L (ref 98–107)
CO2 SERPL-SCNC: 26.4 MMOL/L (ref 22–29)
CREAT SERPL-MCNC: 0.99 MG/DL (ref 0.76–1.27)
EGFRCR SERPLBLD CKD-EPI 2021: 77 ML/MIN/1.73
GLOBULIN UR ELPH-MCNC: 2.5 GM/DL
GLUCOSE SERPL-MCNC: 171 MG/DL (ref 65–99)
HBA1C MFR BLD: 7 % (ref 4.8–5.6)
MAGNESIUM SERPL-MCNC: 1.9 MG/DL (ref 1.6–2.4)
POTASSIUM SERPL-SCNC: 5 MMOL/L (ref 3.5–5.2)
PROT SERPL-MCNC: 6.7 G/DL (ref 6–8.5)
PSA SERPL-MCNC: 0.99 NG/ML (ref 0–4)
SODIUM SERPL-SCNC: 137 MMOL/L (ref 136–145)
TSH SERPL DL<=0.05 MIU/L-ACNC: 1.3 UIU/ML (ref 0.27–4.2)

## 2023-12-11 ENCOUNTER — TELEPHONE (OUTPATIENT)
Dept: FAMILY MEDICINE CLINIC | Facility: CLINIC | Age: 78
End: 2023-12-11
Payer: MEDICARE

## 2023-12-11 NOTE — TELEPHONE ENCOUNTER
Hub is instructed to read the documentation below to patient  Pt is scheduled for appt on 12/12/23. The pt was seen last week, so if the pt needs med refills please let us know what meds the pt's are needing. The pt's son Ferdinand who is on the pt's verbal was unavailable for the call. A voice msg was left for Ferdinand to call the office.

## 2023-12-12 ENCOUNTER — OFFICE VISIT (OUTPATIENT)
Dept: FAMILY MEDICINE CLINIC | Facility: CLINIC | Age: 78
End: 2023-12-12
Payer: MEDICARE

## 2023-12-12 VITALS
DIASTOLIC BLOOD PRESSURE: 70 MMHG | SYSTOLIC BLOOD PRESSURE: 120 MMHG | HEART RATE: 62 BPM | TEMPERATURE: 98.6 F | BODY MASS INDEX: 31.43 KG/M2 | HEIGHT: 66 IN | WEIGHT: 195.6 LBS | OXYGEN SATURATION: 100 %

## 2023-12-12 DIAGNOSIS — Z00.00 ROUTINE GENERAL MEDICAL EXAMINATION AT A HEALTH CARE FACILITY: Primary | ICD-10-CM

## 2023-12-12 PROCEDURE — 1159F MED LIST DOCD IN RCRD: CPT | Performed by: FAMILY MEDICINE

## 2023-12-12 PROCEDURE — 1170F FXNL STATUS ASSESSED: CPT | Performed by: FAMILY MEDICINE

## 2023-12-12 PROCEDURE — G0439 PPPS, SUBSEQ VISIT: HCPCS | Performed by: FAMILY MEDICINE

## 2023-12-12 PROCEDURE — 1160F RVW MEDS BY RX/DR IN RCRD: CPT | Performed by: FAMILY MEDICINE

## 2023-12-12 PROCEDURE — 3074F SYST BP LT 130 MM HG: CPT | Performed by: FAMILY MEDICINE

## 2023-12-12 PROCEDURE — 3078F DIAST BP <80 MM HG: CPT | Performed by: FAMILY MEDICINE

## 2023-12-12 NOTE — PROGRESS NOTES
Jonathan Monreal presents to [facility]      Chief Complaint  Adult Wellness    HPI     No concerns, at this time.      Current Outpatient Medications:     ASPIRIN 81 PO, Take 1 tablet by mouth Daily., Disp: , Rfl:     atorvastatin (LIPITOR) 20 MG tablet, Take 1 tablet by mouth Every Night., Disp: 90 tablet, Rfl: 0    carvedilol (COREG) 12.5 MG tablet, Take 1 tablet by mouth twice daily, Disp: 60 tablet, Rfl: 0    donepezil (ARICEPT) 10 MG tablet, TAKE 1 TABLET BY MOUTH AT NIGHT, Disp: 30 tablet, Rfl: 0    enalapril (VASOTEC) 20 MG tablet, Take 2 tablets by mouth Daily., Disp: 60 tablet, Rfl: 0    fenofibrate (TRICOR) 54 MG tablet, Take 1 tablet by mouth Daily With Dinner., Disp: , Rfl:     finasteride (PROSCAR) 5 MG tablet, Take 1 tablet by mouth Daily., Disp: , Rfl:     levothyroxine (SYNTHROID, LEVOTHROID) 112 MCG tablet, Take 1 tablet by mouth Daily., Disp: , Rfl:     magnesium, as, gluconate (MAGONATE) 500 (27 Mg) MG tablet, Take 1 tablet by mouth Daily., Disp: 30 tablet, Rfl: 0    memantine (NAMENDA) 10 MG tablet, Take 1 tablet by mouth twice daily, Disp: 180 tablet, Rfl: 0    metFORMIN (GLUCOPHAGE) 1000 MG tablet, Take 1 tablet by mouth 2 (Two) Times a Day With Meals., Disp: , Rfl:     multivitamin (THERAGRAN) tablet tablet, Take 1 tablet by mouth Daily., Disp: , Rfl:     Trulicity 0.75 MG/0.5ML solution pen-injector, Inject 0.75 mg under the skin into the appropriate area as directed Every 7 (Seven) Days., Disp: , Rfl:      Allergies   Allergen Reactions    Penicillins Hives        Past Medical History:   Diagnosis Date    Alzheimer's dementia 2022    BPH (benign prostatic hyperplasia)     Diabetes     Melanoma 2013    Wears glasses        Past Surgical History:   Procedure Laterality Date    CIRCUMCISION  03/1943    HAND SURGERY Right 1977    Injury        Family History   Problem Relation Age of Onset    Coronary artery disease Mother     Alzheimer's disease Father     Lung  "cancer Father     Myasthenia gravis Sister     Other Son         TBI @ BIRTH; HANDICAPPED MENTALLY        Social History     Socioeconomic History    Marital status:    Tobacco Use    Smoking status: Never     Passive exposure: Past    Smokeless tobacco: Never   Vaping Use    Vaping Use: Never used   Substance and Sexual Activity    Alcohol use: Never    Drug use: Never    Sexual activity: Defer              Objective   Vital Signs:  /69 (BP Location: Right arm, Patient Position: Sitting, Cuff Size: Adult)   Pulse 62   Temp 98.6 °F (37 °C) (Oral)   Ht 167.6 cm (66\")   Wt 88.7 kg (195 lb 9.6 oz)   SpO2 100%   BMI 31.57 kg/m²   Estimated body mass index is 31.57 kg/m² as calculated from the following:    Height as of this encounter: 167.6 cm (66\").    Weight as of this encounter: 88.7 kg (195 lb 9.6 oz).      Physical Exam  General Appearance:  No acute distress.  Well kept. Good energy level.  Integument:   Warm, dry, pink without exanthema.  Eye        PERRLA, EOMI, no conjunctival palpebrae pallor.  Ears:     Normal auditory canals and tympanic membranes.  Nose:    Breathing comfortably through the nose, 3+ edema, normal nasal mucosa,  no mucous  Sinuses:   Non-tender.  Oral cavity:   No oral lesions, no dental caries.   Throat:   No erythema or exudates.  Normal A:P diameter and width  Neck:      No cervical lymph node enlargement.  No thyroidmegaly, nodules or                     asymmetry.  Heart:     Regular rate and rhythm without murmur.  Lungs:   Clear to auscultation, excellent air movement.  Abdomen:  Normal bowel sounds.  Abdomen soft and non-tender.  No organomegaly.  Extremities/Back:   No scoliosis.  Proper alignment of the lower extremities.  Neurologic:   CN II-XII intact.  Normal cognitive function. Normal speech pattern,                          clear  and articulate.  No tremor.  DTR 2+, Romberg negative.                            Normal gait.  Psych: Mood is pleasant and " relaxed.    Result Review :                   Assessment and Plan   Diagnoses and all orders for this visit:    1. Routine general medical examination at a health care facility (Primary)             Follow Up   Return in about 3 months (around 3/12/2024) for Cor.    Patient was given instructions and counseling regarding his condition or for health maintenance advice. Please see specific information pulled into the AVS if appropriate.

## 2023-12-15 DIAGNOSIS — I10 BENIGN HYPERTENSION: Primary | ICD-10-CM

## 2023-12-20 RX ORDER — ENALAPRIL MALEATE 20 MG/1
40 TABLET ORAL DAILY
Qty: 60 TABLET | Refills: 0 | Status: SHIPPED | OUTPATIENT
Start: 2023-12-20

## 2023-12-22 RX ORDER — CARVEDILOL 12.5 MG/1
12.5 TABLET ORAL 2 TIMES DAILY
Qty: 60 TABLET | Refills: 0 | Status: SHIPPED | OUTPATIENT
Start: 2023-12-22

## 2023-12-29 NOTE — TELEPHONE ENCOUNTER
WIFE CALLED THE OFFICE AND STATED PT WAS OUT OF THIS MEDS PLEASE SEND TO [facility] [place] THANK YOU    Rx Refill Note  Requested Prescriptions     Pending Prescriptions Disp Refills    donepezil (ARICEPT) 10 MG tablet 30 tablet 0     Sig: Take 1 tablet by mouth every night at bedtime.      Last office visit with prescribing clinician: 12/12/2023   Last telemedicine visit with prescribing clinician: Visit date not found   Next office visit with prescribing clinician: 3/7/2024                         Would you like a call back once the refill request has been completed: [] Yes [] No    If the office needs to give you a call back, can they leave a voicemail: [] Yes [] No    Syd Argueta Rep  12/29/23, 11:14 EST

## 2024-01-02 RX ORDER — DONEPEZIL HYDROCHLORIDE 10 MG/1
10 TABLET, FILM COATED ORAL
Qty: 90 TABLET | Refills: 0 | Status: SHIPPED | OUTPATIENT
Start: 2024-01-02

## 2024-02-27 ENCOUNTER — TELEPHONE (OUTPATIENT)
Dept: FAMILY MEDICINE CLINIC | Facility: CLINIC | Age: 79
End: 2024-02-27
Payer: MEDICARE

## 2024-02-27 NOTE — TELEPHONE ENCOUNTER
Hub is instructed to read the documentation below to patient  Pt needs to be RS from 3/7/24, due to provider going to be out. The pt's son Ferdinand who is on the pt's verbal, was unavailable for the call. A msg was left for Ferdinand to call office. According to the pt's verbal, it's okay to [facility].

## 2024-04-08 NOTE — TELEPHONE ENCOUNTER
Patient is off boarding from Dr. Tomlinson. He has appointment on 12.17.2024    Rx Refill Note  Requested Prescriptions     Pending Prescriptions Disp Refills    donepezil (ARICEPT) 10 MG tablet [Pharmacy Med Name: Donepezil HCl 10 MG Oral Tablet] 90 tablet 0     Sig: TAKE 1 TABLET BY MOUTH EVERY DAY AT BEDTIME      Last office visit with prescribing clinician: 12/12/2023   Last telemedicine visit with prescribing clinician: Visit date not found   Next office visit with prescribing clinician: Visit date not found                         Would you like a call back once the refill request has been completed: [] Yes [] No    If the office needs to give you a call back, can they leave a voicemail: [] Yes [] No    Mariah Rudolph CMA  04/08/24, 14:50 EDT

## 2024-04-09 RX ORDER — DONEPEZIL HYDROCHLORIDE 10 MG/1
10 TABLET, FILM COATED ORAL
Qty: 90 TABLET | Refills: 0 | OUTPATIENT
Start: 2024-04-09

## 2024-04-09 NOTE — TELEPHONE ENCOUNTER
PT TRANSFER CARE DIDNT KNOW PCP NAME. PT STATES THAT HE HAS APPOINTMENT TODAY WITH NEW PCP AT 1PM.

## 2024-05-22 DIAGNOSIS — I10 BENIGN HYPERTENSION: ICD-10-CM

## 2024-05-22 RX ORDER — CARVEDILOL 12.5 MG/1
12.5 TABLET ORAL 2 TIMES DAILY
Qty: 60 TABLET | Refills: 0 | OUTPATIENT
Start: 2024-05-22

## 2024-05-22 RX ORDER — ENALAPRIL MALEATE 20 MG/1
40 TABLET ORAL DAILY
Qty: 60 TABLET | Refills: 0 | OUTPATIENT
Start: 2024-05-22

## 2024-05-22 RX ORDER — MEMANTINE HYDROCHLORIDE 10 MG/1
TABLET ORAL
Qty: 180 TABLET | Refills: 0 | OUTPATIENT
Start: 2024-05-22

## 2024-05-24 DIAGNOSIS — I10 BENIGN HYPERTENSION: ICD-10-CM

## 2024-05-24 RX ORDER — CARVEDILOL 12.5 MG/1
12.5 TABLET ORAL 2 TIMES DAILY
Qty: 60 TABLET | Refills: 0 | OUTPATIENT
Start: 2024-05-24

## 2024-05-24 RX ORDER — ENALAPRIL MALEATE 20 MG/1
40 TABLET ORAL DAILY
Qty: 60 TABLET | Refills: 0 | OUTPATIENT
Start: 2024-05-24

## 2024-05-24 RX ORDER — MEMANTINE HYDROCHLORIDE 10 MG/1
TABLET ORAL
Qty: 180 TABLET | Refills: 0 | OUTPATIENT
Start: 2024-05-24